# Patient Record
Sex: FEMALE | Race: WHITE | NOT HISPANIC OR LATINO | Employment: FULL TIME | URBAN - METROPOLITAN AREA
[De-identification: names, ages, dates, MRNs, and addresses within clinical notes are randomized per-mention and may not be internally consistent; named-entity substitution may affect disease eponyms.]

---

## 2017-03-16 ENCOUNTER — DOCUMENTATION ONLY (OUTPATIENT)
Dept: ENDOCRINOLOGY | Facility: CLINIC | Age: 47
End: 2017-03-16

## 2017-03-16 NOTE — PROGRESS NOTES
GRADE study visit    Patient is here for 36 month GRADE study visit. Patient is doing well and continues on Metformin 1000 mg bid in addition to randomized treatment of Victoza 1.2 mg daily (reduced from 1.8).  Her last a1c was 6.7%.  She is, however, continuing to have bloating and nausea and GERD symptoms.  She is taking a PPI, which helps.   She has tried avoiding dairy, which seems to have helped. She otherwise has no concerns today.       Lab results:   A1c: 7.3%    Plan:   1. Diabetes- A bit above target.  With her GI symptoms, offered to stop Victoza x 1 week to se eif symptoms improve.  If so, will rechallenge at 0.6 mg daily x 2 weeks.  She will let us know if she would like to do so. If no improvement, consider testing for H.pylori.  2. Follow up in 3 months, sooner with concerns.     Maryanne Okeefe PA-C, MPAS   [facility]  Department of Medicine  Division of Endocrinology and Diabetes

## 2017-06-15 ENCOUNTER — DOCUMENTATION ONLY (OUTPATIENT)
Dept: ENDOCRINOLOGY | Facility: CLINIC | Age: 47
End: 2017-06-15

## 2017-06-15 NOTE — PROGRESS NOTES
GRADE study visit    Patient is here for 42 month GRADE study visit. Patient is doing well and continues on Metformin 1000 mg bid in addition to randomized treatment of Victoza 1.2 mg daily.  Her GI side effects improved after she stopped Victoza at her last visit x 1 week.   She then re-challenged on 1.2 mg and did fine.    She is trying to make better food choices, but struggles with this.  She will be traveling in [place] in August. She otherwise has no concerns today.       Lab results:   A1c: 7.1%    Plan:   1. Diabetes- A bit above target.  No change.  Can not tolerate higher dose of Victoza.  Encouraged increasing fruit/vegetable consumption.   2. Follow up in 3 months, sooner with concerns.     Maryanne Okeefe PA-C, MPAS   [facility]  Department of Medicine  Division of Endocrinology and Diabetes

## 2017-09-25 ENCOUNTER — DOCUMENTATION ONLY (OUTPATIENT)
Dept: ENDOCRINOLOGY | Facility: CLINIC | Age: 47
End: 2017-09-25

## 2017-09-25 LAB — HBA1C MFR BLD: 7.7 % (ref 0–5.7)

## 2017-09-25 NOTE — PROGRESS NOTES
"  GRADE study visit    Patient is here for 45 month GRADE study visit. Patient is doing well and continues on Metformin 1000 mg bid in addition to randomized treatment of victoza 1.2 mg. She found that she felt nasueated and \"sick\" on the 1.8 mg dose.  Weight is done 0.7 kg from last visit.  Hasn't been checking BG.            Plan:   1.  Diabetes- A1c pending today.  Continue current regimen for now with plan to alter as needed based on A1c.    2.  Follow up in 3 months.     Robyn Villarreal MD  [facility]  Department of Medicine  Division of Endocrinology and Diabetes        Robyn Villarreal MD  "

## 2017-12-11 ENCOUNTER — DOCUMENTATION ONLY (OUTPATIENT)
Dept: ENDOCRINOLOGY | Facility: CLINIC | Age: 47
End: 2017-12-11

## 2017-12-11 LAB
ALBUMIN URINE MG/G CR: 3.23 MG/G CREATININE
ALBUMIN URINE MG/SPEC: NORMAL
CREAT SERPL-MCNC: 0.77 MG/DL
CREATININE URINE: NORMAL
GFR SERPL CREATININE-BSD FRML MDRD: >90 ML/MIN/1.73M2
HBA1C MFR BLD: 7.7 % (ref 0–5.7)
LDLC SERPL CALC-MCNC: 108 MG/DL
VIT B12 SERPL-MCNC: 1584 PG/ML

## 2017-12-11 NOTE — PROGRESS NOTES
GRADE study visit    Patient is here for 48 month GRADE study visit. Patient is doing well and continues on Metformin 1000 mg bid in addition to randomized treatment of victoza. Her last A1c was 7.7 so she increased the dose from 1.2 mg to 1.8 mg a day. She is tolerating it OK.  Is concerned A1c will still be higher because she hasn't been eating as well as she should.         Plan:   1.  Diabetes- A1c pending today.  Continue current regimen for now with plan to alter as needed based on A1c.  Discussed that if A1c is > 7.5 protocol calls for us to add lantus.   2.  Follow up in 3 months.     Robyn Villarreal MD  [facility]  Department of Medicine  Division of Endocrinology and Diabetes        Robyn Villarreal MD

## 2018-03-26 ENCOUNTER — DOCUMENTATION ONLY (OUTPATIENT)
Dept: ENDOCRINOLOGY | Facility: CLINIC | Age: 48
End: 2018-03-26

## 2018-03-26 LAB — HBA1C MFR BLD: 8.4 % (ref 0–5.7)

## 2018-03-26 NOTE — PROGRESS NOTES
GRADE study visit    Patient is here for 51 month GRADE study visit. Patient is doing well and continues on Metformin 1000 mg bid in addition to randomized treatment of victoza 1.8 mg a day.. Her last A1c was 7.7 and she doesn't think it will be much better. Today.  She is tolerating victoza OK. Resigned to fact she will likely need to go on insulin.       Plan:   1.  Diabetes- A1c pending today.  Continue current regimen for now with plan to alter as needed based on A1c.  Discussed that if A1c is > 7.5 protocol calls for us to add lantus.   2.  Follow up in 3 months.     Robyn Villarreal MD  [facility]  Department of Medicine  Division of Endocrinology and Diabetes        Robyn Villarreal MD

## 2018-04-04 ENCOUNTER — DOCUMENTATION ONLY (OUTPATIENT)
Dept: ENDOCRINOLOGY | Facility: CLINIC | Age: 48
End: 2018-04-04

## 2018-04-04 DIAGNOSIS — E11.9 TYPE 2 DIABETES MELLITUS WITHOUT COMPLICATION, WITH LONG-TERM CURRENT USE OF INSULIN (H): Primary | ICD-10-CM

## 2018-04-04 DIAGNOSIS — Z79.4 TYPE 2 DIABETES MELLITUS WITHOUT COMPLICATION, WITH LONG-TERM CURRENT USE OF INSULIN (H): Primary | ICD-10-CM

## 2018-04-04 NOTE — PROGRESS NOTES
GRADE Additional Study Visit    Patient is here for Lantus start. Hemoglobin A1c after last appointment was 8.4%. Risks and benefits of adding insulin discussed, particularly the risk of hypoglycemia and what to do if she has symptoms. Will start Lantus 20 units QHS and increase by one unit daily until fasting BG is <100. She will be in close contact with the GRADE study team during titration. Follow up in clinic in 3 months as scheduled.     Christiana Corrales MD  Endocrinology and Diabetes   [phone]

## 2018-07-20 ENCOUNTER — DOCUMENTATION ONLY (OUTPATIENT)
Dept: ENDOCRINOLOGY | Facility: CLINIC | Age: 48
End: 2018-07-20

## 2018-07-20 NOTE — PROGRESS NOTES
GRADE Study Visit    Patient is here for 54 month GRADE study visit. She is doing well. Current medications for diabetes are metformin XR 1000 mg twice daily, Victoza 1.8 mg daily, and Lantus 36 units daily. Lantus was added after her last study visit because of elevated A1c. She has started testing blood sugars every morning with typical values of 80-90. She had one morning blood sugar of 58 with symptoms, and no other hypoglycemia.     She has a new job which includes more walking and leaves less time for snacking. Weight is down 2.7 kg.     Lab Results:   Component      Latest Ref Rng & Units 7/20/2018   Hemoglobin A1C      % 5.9   Albumin Urine mg/g Cr      <30 MG/G Creatinine 2.9       Plan:  1. Diabetes. Doing well. Fasting blood sugars are on the low side, which increases her risk of hypoglycemia. Will decrease Lantus to 34 units daily, and advised her she can titrate to goal fasting blood sugar of  most if the time.   2. Follow up in 3 months, sooner with concerns.     Christiana Corrales MD  [facility]  Department of Medicine  Division of Endocrinology and Diabetes

## 2018-07-24 LAB
ALBUMIN URINE MG/G CR: 2.9 MG/G CREATININE
ALBUMIN URINE MG/SPEC: NORMAL
CREATININE URINE: NORMAL
HBA1C MFR BLD: 5.9 % (ref 0–5.7)

## 2018-10-12 ENCOUNTER — DOCUMENTATION ONLY (OUTPATIENT)
Dept: ENDOCRINOLOGY | Facility: CLINIC | Age: 48
End: 2018-10-12

## 2018-10-12 NOTE — PROGRESS NOTES
GRADE study visit    Patient is here for 57 month GRADE study visit. Patient is doing well and continues on Metformin 1000 XR mg bid in addition to randomized treatment of Victoza 1.8 mg daily. She is also taking Lantus 34 units HS. Patient is tolerating medications and has been having no side effects. Patient is testing blood sugars every morning with a range of  for the past 2 weeks. No symptomatic hypoglycemia but several am readings below 70 mg/dl.    Blood pressure: 115/79    Lab results:   A1c: 6.1    IMP: Doing well but low am readings.  Plan: Will continue current regimen except reduce Lantus to 32 units HS. Follow up in 3 months, sooner with concerns.     Dawood Corrales MD  [facility]  Department of Medicine  Division of Endocrinology and Diabetes

## 2018-10-15 LAB — HBA1C MFR BLD: 6.1 % (ref 0–5.6)

## 2019-01-03 ENCOUNTER — DOCUMENTATION ONLY (OUTPATIENT)
Dept: ENDOCRINOLOGY | Facility: CLINIC | Age: 49
End: 2019-01-03

## 2019-01-03 LAB
CHOLEST SERPL-MCNC: 155 MG/DL
CREAT SERPL-MCNC: 0.88 MG/DL
HBA1C MFR BLD: 5.8 % (ref 0–5.6)
HDLC SERPL-MCNC: 38 MG/DL
LDL CHOLESTEROL: 89 MG/DL
MICROALBUMIN - QUEST: 3.17
TRIGL SERPL-MCNC: 141 MG/DL

## 2019-01-03 NOTE — PROGRESS NOTES
GRADE study visit    Patient is here for 60 month GRADE study visit. Patient continues on Metformin 1000 XR mg bid in addition to randomized treatment of Victoza 1.8 mg daily. She is also taking Lantus 32 units HS.  She has having frequent nausea and vomiting, so she looked up her symptoms online and determined that she has gastroparesis.  She has changed her diet to low fiber, cooked vegetables only and she feels much better.  She has been on a PPI for years for GERD symptoms and she has weaned down to every other day omeprazole.  Patient is testing blood sugars every morning with an average of 90 mg/dL.  She has had several readings in the 40's and 50's in the middle of the night which wake her up.  No other concerns. Last a1c was 6.1%.     Blood pressure: 114/88    Lab results:   A1c: 5.8%   Cholesterol: 155  Triglycerides: 141  HDL: 38  LDL: 89  Creatinine: 0.88  Albumin:creatinine Ratio: 3.17    Plan:   1.  Diabetes- Doing quite well, but having too much hypoglycemia.  Will continue current regimen except reduce Lantus to 28 units HS. Offered to discontinue or significantly reduce Victoza as I suspect her 'gastroparesis' symptoms are truly due to GI slowing from GLP-1 agonist.  Also concerned about her taking long term PPI (likely GLP is worsening her GERD symptoms).  I suggested weaning off the PPI and following up with her PCP.  She says symptoms are improved after changing her diet and she prefers to remain on Victoza for now.  She will let us know if she changes her mind.    2. Follow up in 3 months, sooner with concerns.     Maryanne Okeefe PA-C  [facility]  Department of Medicine  Division of Endocrinology and Diabetes

## 2019-04-16 ENCOUNTER — DOCUMENTATION ONLY (OUTPATIENT)
Dept: ENDOCRINOLOGY | Facility: CLINIC | Age: 49
End: 2019-04-16

## 2019-04-16 DIAGNOSIS — E11.9 TYPE 2 DIABETES MELLITUS WITHOUT COMPLICATION, WITH LONG-TERM CURRENT USE OF INSULIN (H): ICD-10-CM

## 2019-04-16 DIAGNOSIS — Z79.4 TYPE 2 DIABETES MELLITUS WITHOUT COMPLICATION, WITH LONG-TERM CURRENT USE OF INSULIN (H): ICD-10-CM

## 2019-04-16 NOTE — PROGRESS NOTES
GRADE study visit    Subject is here for 63 month GRADE study visit. Subject is doing well and continues on Metformin 2000 mg daily in addition to randomized treatment of Victoza 1.8 mg daily.  Subject now also on Lantus per protocol - current dose 28 units daily.    Subject is tolerating medications- was having some problems with nausea/vomiting at times but has found that with drinking lots of water and some change in diet this has improved and currently she does not feel it is an issue.     Subject is testing blood sugars regularly in am fasting - bs typically in 90's.  No problems with hypoglycemia.    Last A1c = 5.8%    Lab results:     A1c: 6.1      Plan:   1. Diabetes- A1c in target - no change in study meds.  2. Follow up in 3 months, sooner with concerns.       Mejia Menezes MD  [facility]  Department of Medicine  Division of Endocrinology and Diabetes

## 2019-04-18 LAB — HBA1C MFR BLD: 6.1 % (ref 0–5.6)

## 2019-06-25 ENCOUNTER — DOCUMENTATION ONLY (OUTPATIENT)
Dept: ENDOCRINOLOGY | Facility: CLINIC | Age: 49
End: 2019-06-25

## 2019-06-25 NOTE — PROGRESS NOTES
GRADE study visit     Subject is here for 66 month GRADE study visit. Subject is doing well and continues on Metformin 2000 mg daily in addition to randomized treatment of Victoza 1.8 mg daily.  Subject now also on Lantus per protocol - current dose 28 units daily.      Subject is testing blood sugars regularly in am fasting - bs typically in  range .  No problems with hypoglycemia.     Last A1c = 6.1%     Lab results:      A1c:  6.3  Ur alb/cr  3.77        Plan:   1. Diabetes- A1c at goal - no change in Rx.   2. Follow up in 3 months, sooner with concerns.         Mejia Menezes MD  [facility]  Department of Medicine  Division of Endocrinology and Diabetes

## 2019-07-02 LAB
ALBUMIN URINE MG/G CR: 3.77 MG/G CREATININE
ALBUMIN URINE MG/SPEC: 11
CREATININE URINE: 292
HBA1C MFR BLD: 6.3 % (ref 0–5.6)

## 2019-09-17 ENCOUNTER — DOCUMENTATION ONLY (OUTPATIENT)
Dept: ENDOCRINOLOGY | Facility: CLINIC | Age: 49
End: 2019-09-17

## 2019-09-17 DIAGNOSIS — Z79.4 TYPE 2 DIABETES MELLITUS WITHOUT COMPLICATION, WITH LONG-TERM CURRENT USE OF INSULIN (H): ICD-10-CM

## 2019-09-17 DIAGNOSIS — E11.9 TYPE 2 DIABETES MELLITUS WITHOUT COMPLICATION, WITH LONG-TERM CURRENT USE OF INSULIN (H): ICD-10-CM

## 2019-09-17 NOTE — PROGRESS NOTES
GRADE study visit     Subject is here for 69 month GRADE study visit. Subject is doing well and continues on Metformin 2000 mg daily in addition to randomized treatment of Victoza 1.8 mg daily.  Subject now also on Lantus per protocol - current dose 28 units daily.      Subject is testing blood sugars regularly in am fasting - bs typically in 70's - 80's range .  No problems with hypoglycemia.     Last A1c = 6.3%     Lab results:      A1c:  6.1    Plan:   1. Diabetes- A1c excellent - given fasting bs levels we can cut back Lantus a little - will recc she go to 25 units daily.    2. Follow up in 3 months, sooner with concerns.         Mejia Menezes MD  [facility]  Department of Medicine  Division of Endocrinology and Diabetes

## 2019-10-01 LAB — HBA1C MFR BLD: 6.1 % (ref 0–5.6)

## 2019-12-11 ENCOUNTER — DOCUMENTATION ONLY (OUTPATIENT)
Dept: ENDOCRINOLOGY | Facility: CLINIC | Age: 49
End: 2019-12-11

## 2019-12-11 LAB
CHOLEST SERPL-MCNC: 151 MG/DL
CHOLEST/HDLC SERPL: NORMAL {RATIO}
HDLC SERPL-MCNC: 42 MG/DL
LDLC SERPL CALC-MCNC: 83 MG/DL
TRIGL SERPL-MCNC: 129 MG/DL

## 2019-12-11 NOTE — PROGRESS NOTES
GRADE study visit     Subject is here for 72 month GRADE study visit. Subject is doing well and continues on Metformin 2000 mg daily in addition to randomized treatment of Victoza 1.8 mg daily.  Subject now also on Lantus per protocol - current dose 24 units daily.      Subject is testing blood sugars regularly in am fasting - bs typically in 80's range .  No problems with hypoglycemia. She continues to exercise regularly.     Last A1c = 6.1%     Lab results:   Lab Results   Component Value Date    A1C 5.9 12/11/2019    A1C 6.1 09/17/2019    A1C 6.3 06/25/2019    A1C 6.1 04/16/2019    A1C 5.8 01/03/2019       Plan:   1. Diabetes- A1c excellent - continue current regimen    2. Follow up in 3 months, sooner with concerns.       Tahir Rendon MD  Division of Diabetes and Endocrinology  Department of Medicine

## 2019-12-12 LAB
ALBUMIN URINE MG/G CR: NORMAL MG/G CREATININE
ALBUMIN URINE MG/SPEC: <1
CREAT SERPL-MCNC: 0.91 MG/DL
CREATININE URINE: 37
GFR SERPL CREATININE-BSD FRML MDRD: 73 ML/MIN/1.73M2
HBA1C MFR BLD: 5.9 % (ref 0–5.6)

## 2020-03-19 ENCOUNTER — DOCUMENTATION ONLY (OUTPATIENT)
Dept: ENDOCRINOLOGY | Facility: CLINIC | Age: 50
End: 2020-03-19

## 2020-05-06 LAB — HBA1C MFR BLD: 6 % (ref 0–5.6)

## 2020-05-07 NOTE — PROGRESS NOTES
GRADE study visit     Subject is on the phone for 75 month GRADE study visit. Subject is doing well and continues on Metformin 2000 mg daily in addition to randomized treatment of Victoza 1.8 mg daily.  Subject now also on Lantus per protocol - current dose 24 units daily.      Subject is testing blood sugars regularly in am fasting - bs typically in 80's range .  No problems with hypoglycemia. She continues to exercise regularly.     Plan:   1. Diabetes-Doing well continue current regimen    2. Follow up in 3 months, sooner with concerns.      GUILHERME Palmer  Division of Diabetes and Endocrinology  Department of Medicine

## 2020-06-25 ENCOUNTER — DOCUMENTATION ONLY (OUTPATIENT)
Dept: ENDOCRINOLOGY | Facility: CLINIC | Age: 50
End: 2020-06-25

## 2020-06-25 NOTE — PROGRESS NOTES
GRADE study visit     Subject is on the phone for 78 month GRADE study visit. Subject is doing well and continues on Metformin 2000 mg daily in addition to randomized treatment of Victoza 1.8 mg daily.  Subject now also on Lantus per protocol - current dose 24 units daily.      Subject is testing blood sugars regularly in am fasting - bs typically in 80's range .  No problems with hypoglycemia. She continues to exercise regularly.     Plan:   1. Diabetes-Doing well continue current regimen.  Discussed COVID 19 precautions.     2. Follow up in 3 months, sooner with concerns.      LARRY Palmer_C  Division of Diabetes and Endocrinology  Department of Medicine

## 2020-09-24 ENCOUNTER — DOCUMENTATION ONLY (OUTPATIENT)
Dept: ENDOCRINOLOGY | Facility: CLINIC | Age: 50
End: 2020-09-24

## 2020-09-24 NOTE — PROGRESS NOTES
GRADE study visit     Subject is on the phone for 81 month GRADE study visit. Subject is doing well and continues on Metformin 2000 mg daily in addition to randomized treatment of Victoza 1.8 mg daily.  Subject now also on Lantus per protocol - current dose 22 units daily.      Subject is testing blood sugars regularly in am fasting - bs typically in 90's range .  No problems with hypoglycemia. She continues to exercise regularly.     Plan:   1. Diabetes-Doing well.   Will try to reduce Lantus further to 20 units, as glucose is well controlled.  Post-study, may trial off Lantus.  Discussed COVID 19 precautions.     2. Follow up in 3 months, sooner with concerns.      LARRY Palmer_C  Division of Diabetes and Endocrinology  Department of Medicine

## 2021-01-07 ENCOUNTER — DOCUMENTATION ONLY (OUTPATIENT)
Dept: ENDOCRINOLOGY | Facility: CLINIC | Age: 51
End: 2021-01-07

## 2021-01-07 LAB
ALBUMIN/CREATININE RATIO: 3.26
CHOLEST SERPL-MCNC: 149 MG/DL
CREAT SERPL-MCNC: 0.86 MG/DL
GFR SERPL CREATININE-BSD FRML MDRD: 77 ML/MIN/1.7
HBA1C MFR BLD: 5.9 % (ref 0–5.6)
HDLC SERPL-MCNC: 44 MG/DL
LDL CHOLESTEROL: 82 MG/DL
TRIGL SERPL-MCNC: 115 MG/DL

## 2021-01-07 NOTE — PROGRESS NOTES
Dear Ms. Lucia,       As you may recall, Anne has been a participant in the LEANDRO Study.  LEANDRO is a multicenter diabetes study in the [place] funded by the [facility] with a goal of determining whether there is an optimal combination of medications for treating type 2 diabetes.  All participants in GRADE were prescribed metformin and were initially randomized to one of four additional diabetes medications:   (1) glimepiride (Amaryl), (2) insulin glargine (Lantus), (3) liraglutide (Victoza) or (4) sitagliptin (Januvia).  Anne will be returning to you for diabetes management from the GRADE Study. We want to communicate the following at this time:    Her last [facility] study visit was 1/7/2021    She was given a 30 day supply of diabetes medication(s) and was instructed to make a follow-up appointment with you within 30 days for  new diabetes medication prescriptions    Attached is a report of her [facility] Study progress including labwork and medication history      Below is a brief summary of your patient's diabetes progression and salient clinical concerns during their enrollment in [facility]:       Anne enrolled in the [facility] study on 9/16/2013 at which time her a1c was 9.1%.  She was randomized to receive metformin and Victoza.  Over the course of the study, she managed quite well (a1c <7%) until 9/2017, when her a1c climbed to 7.7% and Lantus insulin was added as rescue therapy.  She has since been able to maintain her a1c in the 5-6% range with very little hypoglycemia. She has been able to maintain a significant weight loss (baseline weight 104kg, now 100 kg).     Anne's A1c value at his/her last [facility] study visit was 6.1%.  Currently Anne is taking:   Metformin (ER) 1000 mg/ bid   Victoza 1.8 mg daily   Lantus 15 units daily    The American Diabetes Association (ADA) recommends individualizing A1c glycemic targets for people with type 2 diabetes.  Factors to consider include duration of diabetes, presence of other important chronic  conditions, especially cardiovascular conditions, potential risk of medication side effects such as hypoglycemia, patient preference and cost of medications.  For many people, an A1c target < 7% may be appropriate.  However, depending on factors above, a more relaxed target of < 8% may be acceptable.    The ADA recommends metformin as the first line medication for treating type 2 diabetes.  When a second medication is needed, factors which might influence choice include presence of cardiovascular or renal disease, risk of hypoglycemia, desire for weight neutral or weight loss effects, patient preference and cost.  The ADA Standards of Care (https://professional.diabetes.org/) are a resource for providers in the management of type 2 diabetes.  We hope that when the results of the GRADE study are available, they will provide further guidance for physicians and patients in the choice of medication regimens.      The results of the GRADE Study will be available at the end of 2021.  We will be providing GRADE participants the main findings of the trial at that time prior to release to the general public.   We greatly appreciate the opportunity to participate in Anne's diabetes care over the course of the GRADE study, and we appreciate Anne's donation of time and effort as a participant in the study.  Please feel free to contact us at [phone] if you have questions or if we can provide further information during this transition.    Sincerely,      Maryanne Okeefe PA-C, MPAS  GRADE Study Investigator

## 2021-01-07 NOTE — LETTER
Patient:  Anne Kirk  :   1970  MRN:     [id number]    2021    Dear Ms. Lucia,     As you may recall, Anne has been a participant in the [place] Study.  [facility] is a multicenter diabetes study in the [place] funded by the [facility] with a goal of determining whether there is an optimal combination of medications for treating type 2 diabetes.  All participants in GRADE were prescribed metformin and were initially randomized to one of four additional diabetes medications:   (1) glimepiride (Amaryl), (2) insulin glargine (Lantus), (3) liraglutide (Victoza) or (4) sitagliptin (Januvia).  Anne will be returning to you for diabetes management from the GRADE Study. We want to communicate the following at this time:    Her last [facility] study visit was 2021    She was given a 30 day supply of diabetes medication(s) and was instructed to make a follow-up appointment with you within 30 days for  new diabetes medication prescriptions    Attached is a report of her [place] Study progress including labwork and medication history      Below is a brief summary of your patient's diabetes progression and salient clinical concerns during their enrollment in [facility]:       Anne enrolled in the [place] study on 2013 at which time her a1c was 9.1%.  She was randomized to receive metformin and Victoza.  Over the course of the study, she managed quite well (a1c <7%) until 2017, when her a1c climbed to 7.7% and Lantus insulin was added as rescue therapy.  She has since been able to maintain her a1c in the 5-6% range with very little hypoglycemia. She has been able to maintain a significant weight loss (baseline weight 104kg, now 100 kg).     Anne's A1c value at his/her last [facility] study visit was 6.1%.  Currently Anne is taking:   Metformin (ER) 1000 mg bid   Victoza 1.8 mg daily   Lantus 15 units daily    The American Diabetes Association (ADA) recommends individualizing A1c glycemic targets for people with type 2 diabetes.   Factors to consider include duration of diabetes, presence of other important chronic conditions, especially cardiovascular conditions, potential risk of medication side effects such as hypoglycemia, patient preference and cost of medications.  For many people, an A1c target < 7% may be appropriate.  However, depending on factors above, a more relaxed target of < 8% may be acceptable.    The ADA recommends metformin as the first line medication for treating type 2 diabetes.  When a second medication is needed, factors which might influence choice include presence of cardiovascular or renal disease, risk of hypoglycemia, desire for weight neutral or weight loss effects, patient preference and cost.  The ADA Standards of Care (https://professional.diabetes.org/) are a resource for providers in the management of type 2 diabetes.  We hope that when the results of the GRADE study are available, they will provide further guidance for physicians and patients in the choice of medication regimens.      The results of the GRADE Study will be available at the end of 2021.  We will be providing GRADE participants the main findings of the trial at that time prior to release to the general public.   We greatly appreciate the opportunity to participate in Anne's diabetes care over the course of the GRADE study, and we appreciate Anne's donation of time and effort as a participant in the study.  Please feel free to contact us at [phone] if you have questions or if we can provide further information during this transition.    Sincerely,      Maryanne Okeefe PA-C, MPAS  GRADE Study Investigator

## 2022-04-15 ENCOUNTER — TELEPHONE (OUTPATIENT)
Dept: UROLOGY | Facility: CLINIC | Age: 52
End: 2022-04-15

## 2022-04-15 ENCOUNTER — APPOINTMENT (OUTPATIENT)
Dept: CT IMAGING | Facility: HOSPITAL | Age: 52
End: 2022-04-15
Attending: EMERGENCY MEDICINE
Payer: COMMERCIAL

## 2022-04-15 ENCOUNTER — HOSPITAL ENCOUNTER (EMERGENCY)
Facility: HOSPITAL | Age: 52
Discharge: HOME | End: 2022-04-15
Attending: EMERGENCY MEDICINE | Admitting: EMERGENCY MEDICINE
Payer: COMMERCIAL

## 2022-04-15 VITALS
HEIGHT: 63 IN | BODY MASS INDEX: 33.66 KG/M2 | WEIGHT: 190 LBS | OXYGEN SATURATION: 100 % | TEMPERATURE: 97.1 F | HEART RATE: 71 BPM | DIASTOLIC BLOOD PRESSURE: 86 MMHG | SYSTOLIC BLOOD PRESSURE: 176 MMHG | RESPIRATION RATE: 20 BRPM

## 2022-04-15 DIAGNOSIS — R10.9 FLANK PAIN: ICD-10-CM

## 2022-04-15 DIAGNOSIS — N20.0 KIDNEY STONE: ICD-10-CM

## 2022-04-15 DIAGNOSIS — N20.1 URETEROLITHIASIS: ICD-10-CM

## 2022-04-15 LAB
ALBUMIN SERPL-MCNC: 4.1 G/DL (ref 3.5–5)
ALBUMIN UR-MCNC: NEGATIVE MG/DL
ALP SERPL-CCNC: 51 U/L (ref 45–120)
ALT SERPL W P-5'-P-CCNC: 19 U/L (ref 0–45)
ANION GAP SERPL CALCULATED.3IONS-SCNC: 11 MMOL/L (ref 5–18)
APPEARANCE UR: CLEAR
AST SERPL W P-5'-P-CCNC: 9 U/L (ref 0–40)
BACTERIA #/AREA URNS HPF: ABNORMAL /HPF
BASOPHILS # BLD MANUAL: 0 10E3/UL (ref 0–0.2)
BASOPHILS NFR BLD MANUAL: 0 %
BILIRUB SERPL-MCNC: 0.3 MG/DL (ref 0–1)
BILIRUB UR QL STRIP: NEGATIVE
BUN SERPL-MCNC: 21 MG/DL (ref 8–22)
CALCIUM SERPL-MCNC: 9.3 MG/DL (ref 8.5–10.5)
CHLORIDE BLD-SCNC: 105 MMOL/L (ref 98–107)
CO2 SERPL-SCNC: 23 MMOL/L (ref 22–31)
COLOR UR AUTO: ABNORMAL
CREAT SERPL-MCNC: 1.18 MG/DL (ref 0.6–1.1)
EOSINOPHIL # BLD MANUAL: 0.1 10E3/UL (ref 0–0.7)
EOSINOPHIL NFR BLD MANUAL: 1 %
ERYTHROCYTE [DISTWIDTH] IN BLOOD BY AUTOMATED COUNT: 12.4 % (ref 10–15)
GFR SERPL CREATININE-BSD FRML MDRD: 55 ML/MIN/1.73M2
GLUCOSE BLD-MCNC: 140 MG/DL (ref 70–125)
GLUCOSE UR STRIP-MCNC: NEGATIVE MG/DL
HCT VFR BLD AUTO: 40.4 % (ref 35–47)
HGB BLD-MCNC: 13.5 G/DL (ref 11.7–15.7)
HGB UR QL STRIP: NEGATIVE
KETONES UR STRIP-MCNC: NEGATIVE MG/DL
LEUKOCYTE ESTERASE UR QL STRIP: NEGATIVE
LIPASE SERPL-CCNC: 68 U/L (ref 0–52)
LYMPHOCYTES # BLD MANUAL: 4.7 10E3/UL (ref 0.8–5.3)
LYMPHOCYTES NFR BLD MANUAL: 35 %
MCH RBC QN AUTO: 31.5 PG (ref 26.5–33)
MCHC RBC AUTO-ENTMCNC: 33.4 G/DL (ref 31.5–36.5)
MCV RBC AUTO: 94 FL (ref 78–100)
MONOCYTES # BLD MANUAL: 0.9 10E3/UL (ref 0–1.3)
MONOCYTES NFR BLD MANUAL: 7 %
MUCOUS THREADS #/AREA URNS LPF: PRESENT /LPF
NEUTROPHILS # BLD MANUAL: 7.7 10E3/UL (ref 1.6–8.3)
NEUTROPHILS NFR BLD MANUAL: 57 %
NITRATE UR QL: NEGATIVE
PH UR STRIP: 5 [PH] (ref 5–7)
PLAT MORPH BLD: ABNORMAL
PLATELET # BLD AUTO: 242 10E3/UL (ref 150–450)
POLYCHROMASIA BLD QL SMEAR: SLIGHT
POTASSIUM BLD-SCNC: 4.2 MMOL/L (ref 3.5–5)
PROT SERPL-MCNC: 7.5 G/DL (ref 6–8)
RBC # BLD AUTO: 4.28 10E6/UL (ref 3.8–5.2)
RBC MORPH BLD: ABNORMAL
RBC URINE: <1 /HPF
SODIUM SERPL-SCNC: 139 MMOL/L (ref 136–145)
SP GR UR STRIP: 1.02 (ref 1–1.03)
SQUAMOUS EPITHELIAL: 7 /HPF
UROBILINOGEN UR STRIP-MCNC: <2 MG/DL
WBC # BLD AUTO: 13.5 10E3/UL (ref 4–11)
WBC URINE: 1 /HPF

## 2022-04-15 PROCEDURE — 96374 THER/PROPH/DIAG INJ IV PUSH: CPT

## 2022-04-15 PROCEDURE — 83690 ASSAY OF LIPASE: CPT | Performed by: EMERGENCY MEDICINE

## 2022-04-15 PROCEDURE — 258N000003 HC RX IP 258 OP 636: Performed by: EMERGENCY MEDICINE

## 2022-04-15 PROCEDURE — 36415 COLL VENOUS BLD VENIPUNCTURE: CPT | Performed by: EMERGENCY MEDICINE

## 2022-04-15 PROCEDURE — 80053 COMPREHEN METABOLIC PANEL: CPT | Performed by: EMERGENCY MEDICINE

## 2022-04-15 PROCEDURE — 99285 EMERGENCY DEPT VISIT HI MDM: CPT | Mod: 25

## 2022-04-15 PROCEDURE — 250N000011 HC RX IP 250 OP 636: Performed by: EMERGENCY MEDICINE

## 2022-04-15 PROCEDURE — 85027 COMPLETE CBC AUTOMATED: CPT | Performed by: EMERGENCY MEDICINE

## 2022-04-15 PROCEDURE — 96375 TX/PRO/DX INJ NEW DRUG ADDON: CPT

## 2022-04-15 PROCEDURE — 74176 CT ABD & PELVIS W/O CONTRAST: CPT

## 2022-04-15 PROCEDURE — 96361 HYDRATE IV INFUSION ADD-ON: CPT

## 2022-04-15 PROCEDURE — 81001 URINALYSIS AUTO W/SCOPE: CPT | Performed by: EMERGENCY MEDICINE

## 2022-04-15 PROCEDURE — 250N000013 HC RX MED GY IP 250 OP 250 PS 637: Performed by: EMERGENCY MEDICINE

## 2022-04-15 RX ORDER — SODIUM CHLORIDE 9 MG/ML
INJECTION, SOLUTION INTRAVENOUS CONTINUOUS
Status: DISCONTINUED | OUTPATIENT
Start: 2022-04-15 | End: 2022-04-15 | Stop reason: HOSPADM

## 2022-04-15 RX ORDER — DIMENHYDRINATE 50 MG
50 TABLET ORAL EVERY 6 HOURS PRN
Qty: 28 TABLET | Refills: 0 | Status: SHIPPED | OUTPATIENT
Start: 2022-04-15 | End: 2022-04-22

## 2022-04-15 RX ORDER — OXYCODONE HYDROCHLORIDE 5 MG/1
10 TABLET ORAL ONCE
Status: COMPLETED | OUTPATIENT
Start: 2022-04-15 | End: 2022-04-15

## 2022-04-15 RX ORDER — ONDANSETRON 4 MG/1
4 TABLET, ORALLY DISINTEGRATING ORAL EVERY 8 HOURS PRN
Qty: 12 TABLET | Refills: 0 | Status: SHIPPED | OUTPATIENT
Start: 2022-04-15 | End: 2022-04-18

## 2022-04-15 RX ORDER — OXYCODONE HYDROCHLORIDE 5 MG/1
5 TABLET ORAL EVERY 4 HOURS PRN
Qty: 12 TABLET | Refills: 0 | Status: SHIPPED | OUTPATIENT
Start: 2022-04-15 | End: 2022-04-19

## 2022-04-15 RX ORDER — ACETAMINOPHEN 500 MG
1000 TABLET ORAL EVERY 6 HOURS
Qty: 56 TABLET | Refills: 0 | Status: SHIPPED | OUTPATIENT
Start: 2022-04-15 | End: 2022-04-22

## 2022-04-15 RX ORDER — DIMENHYDRINATE 50 MG
50 TABLET ORAL AT BEDTIME
Qty: 7 TABLET | Refills: 0 | Status: SHIPPED | OUTPATIENT
Start: 2022-04-15 | End: 2022-04-22

## 2022-04-15 RX ORDER — IBUPROFEN 200 MG
400 TABLET ORAL EVERY 6 HOURS
Qty: 56 TABLET | Refills: 0 | Status: SHIPPED | OUTPATIENT
Start: 2022-04-15 | End: 2022-04-22

## 2022-04-15 RX ORDER — ONDANSETRON 2 MG/ML
4 INJECTION INTRAMUSCULAR; INTRAVENOUS EVERY 30 MIN PRN
Status: DISCONTINUED | OUTPATIENT
Start: 2022-04-15 | End: 2022-04-15 | Stop reason: HOSPADM

## 2022-04-15 RX ORDER — KETOROLAC TROMETHAMINE 30 MG/ML
15 INJECTION, SOLUTION INTRAMUSCULAR; INTRAVENOUS ONCE
Status: COMPLETED | OUTPATIENT
Start: 2022-04-15 | End: 2022-04-15

## 2022-04-15 RX ADMIN — SODIUM CHLORIDE 500 ML: 9 INJECTION, SOLUTION INTRAVENOUS at 02:40

## 2022-04-15 RX ADMIN — SODIUM CHLORIDE: 9 INJECTION, SOLUTION INTRAVENOUS at 01:41

## 2022-04-15 RX ADMIN — ONDANSETRON 4 MG: 2 INJECTION INTRAMUSCULAR; INTRAVENOUS at 01:40

## 2022-04-15 RX ADMIN — OXYCODONE HYDROCHLORIDE 10 MG: 5 TABLET ORAL at 03:08

## 2022-04-15 RX ADMIN — KETOROLAC TROMETHAMINE 15 MG: 30 INJECTION, SOLUTION INTRAMUSCULAR at 01:41

## 2022-04-15 ASSESSMENT — ENCOUNTER SYMPTOMS
COUGH: 0
CONSTIPATION: 0
FLANK PAIN: 1
BACK PAIN: 1
VOMITING: 1
SHORTNESS OF BREATH: 0
ABDOMINAL PAIN: 0
DIARRHEA: 0
NAUSEA: 1
FEVER: 0

## 2022-04-15 NOTE — TELEPHONE ENCOUNTER
Spoke with patient who is feeling well today.  He does not wish to schedule an appointment at this time.  Education given regarding medications and number to call with any questions or to schedule an appt.  Clementine Bland RN

## 2022-04-15 NOTE — ED PROVIDER NOTES
NAME: Anne Kirk  AGE: 51 year old female  YOB: 1970  MRN: [id number]  EVALUATION DATE & TIME: No admission date for patient encounter.    PCP: No primary care provider on file.    ED PROVIDER: Dami Wei M.D.      Chief Complaint   Patient presents with     Back Pain     R sided     Nausea     FINAL IMPRESSION:  1. Ureterolithiasis    2. Kidney stone    3. Flank pain        MEDICAL DECISION MAKIN:02 AM I met with the patient, obtained history, performed an initial exam, and discussed options and plan for diagnostics and treatment here in the ED. PPE worn: n95 mask, eye protection and gloves.  2:55 AM Results were communicated with the patient. Discussed discharge plans along with return precautions. Patient was agreeable with plan.        Patient was clinically assessed and consented to treatment. After assessment, medical decision making and workup were discussed with the patient. The patient was agreeable to plan for testing, workup, and treatment.  Pertinent Labs & Imaging studies reviewed. (See chart for details)     Anne Kirk is a 51 year old female who presents with right-sided back pain with nausea and vomiting.   Differential diagnosis includes but not limited to kidney stone, pyelonephritis, diverticulitis, colitis, biliary colic.  Patient with right flank pain that is been building and now worsening with nausea and vomiting.  Patient also having repeat bowel movements but not diarrhea.  No blood and no other urinary symptoms.  Patient tender right over the right CVA or flank area.  Will start with labs and urine for further imaging such as ultrasound for gallbladder or CT scan with or without contrast.  IV fluids, pain medication, and antiemetics given.  CBC showed stable hemoglobin with slight leukocytosis.  Metabolic panel shows a slight elevation creatinine and patient was given IV fluids for rehydration.  Lipase was normal and urinalysis returned without any signs  of infection.  CT scan returned showing a right UVJ 2 mm kidney stone.  Patient likely will pass this on her own with expectant management.  We discussed this and patient was started on oral medications to go home on for continued pain control.  Patient does inform me that she is going on an international trip in 2 days.  Informed patient she will need to monitor how she is feeling and continue good hydration to clear urine in the stone as well as monitor pain.  She may need to make a last-minute decision not to go however if she is intent on the the stone is small and will likely pass soon.  Patient will be given prescriptions and is comfortable with plan for care at home as well as given referral and follow-up to the kidney stone [place].  Patient understands instructions and will be discharged home.    0 minutes of critical care time    MEDICATIONS GIVEN IN THE EMERGENCY:  Medications   sodium chloride 0.9% infusion ( Intravenous New Bag 4/15/22 0141)   ondansetron (ZOFRAN) injection 4 mg (4 mg Intravenous Given 4/15/22 0140)   HYDROmorphone (DILAUDID) injection 0.5 mg (has no administration in time range)   oxyCODONE (ROXICODONE) tablet 10 mg (has no administration in time range)   ketorolac (TORADOL) injection 15 mg (15 mg Intravenous Given 4/15/22 0141)   0.9% sodium chloride BOLUS (500 mLs Intravenous New Bag 4/15/22 0240)     NEW PRESCRIPTIONS STARTED AT TODAY'S ER VISIT:  New Prescriptions    ACETAMINOPHEN (TYLENOL) 500 MG TABLET    Take 2 tablets (1,000 mg) by mouth every 6 hours for 7 days    DIMENHYDRINATE (DRAMAMINE) 50 MG TABLET    Take 1 tablet (50 mg) by mouth At Bedtime for 7 days    DIMENHYDRINATE (DRAMAMINE) 50 MG TABLET    Take 1 tablet (50 mg) by mouth every 6 hours as needed for other (kidney stone pain management)    IBUPROFEN (ADVIL/MOTRIN) 200 MG TABLET    Take 2 tablets (400 mg) by mouth every 6 hours for 7 days    ONDANSETRON (ZOFRAN-ODT) 4 MG ODT TAB    Take 1 tablet (4 mg) by mouth  "every 8 hours as needed for nausea    OXYCODONE (ROXICODONE) 5 MG TABLET    Take 1 tablet (5 mg) by mouth every 4 hours as needed for severe pain If pain is not improved with acetaminophen and ibuprofen.     =================================================================    HPI    Patient information was obtained from: patient     Use of : N/A    Anne Kirk is a 51 year old female with a past medical history of T2DM, asthma, obesity, hyperlipidemia, who presents lower right back pain, frequent bowel movements, vomiting, nausea.    Patient reports developing right lower back tightness for the past 3 days that worsened tonight approximately at 11:30PM which woke her up with \"sharp shooting pain\" along with nausea and vomiting. There is no radiaiton to her abdomen or left side of her back. She also has been endorsing associated frequent bowel movements. She denies any actual constipation or diarrhea. She does not feel any relief from pain after bowel movement. No prior history of kidney stones. No fever, cough, shortness of breath. No other medical complaints at this time.     REVIEW OF SYSTEMS   Review of Systems   Constitutional: Negative for fever.   Respiratory: Negative for cough and shortness of breath.    Gastrointestinal: Positive for nausea and vomiting. Negative for abdominal pain, constipation and diarrhea.        Positive frequent bowel movements   Genitourinary: Positive for flank pain (right sided).   Musculoskeletal: Positive for back pain (RLQ \"sharp shooting\" ).   All other systems reviewed and are negative.    PAST MEDICAL HISTORY:  History reviewed. No pertinent past medical history.    PAST SURGICAL HISTORY:  History reviewed. No pertinent surgical history.    CURRENT MEDICATIONS:      Current Facility-Administered Medications:      HYDROmorphone (DILAUDID) injection 0.5 mg, 0.5 mg, Intravenous, Once PRN, Dami Wei MD     ondansetron (ZOFRAN) injection 4 mg, 4 mg, " "Intravenous, Q30 Min PRN, Dami Wei MD, 4 mg at 04/15/22 0140     oxyCODONE (ROXICODONE) tablet 10 mg, 10 mg, Oral, Once, Dami Wei MD     sodium chloride 0.9% infusion, , Intravenous, Continuous, Dami Wei MD, Last Rate: 125 mL/hr at 04/15/22 0141, New Bag at 04/15/22 0141    Current Outpatient Medications:      acetaminophen (TYLENOL) 500 MG tablet, Take 2 tablets (1,000 mg) by mouth every 6 hours for 7 days, Disp: 56 tablet, Rfl: 0     dimenhyDRINATE (DRAMAMINE) 50 MG tablet, Take 1 tablet (50 mg) by mouth At Bedtime for 7 days, Disp: 7 tablet, Rfl: 0     dimenhyDRINATE (DRAMAMINE) 50 MG tablet, Take 1 tablet (50 mg) by mouth every 6 hours as needed for other (kidney stone pain management), Disp: 28 tablet, Rfl: 0     ibuprofen (ADVIL/MOTRIN) 200 MG tablet, Take 2 tablets (400 mg) by mouth every 6 hours for 7 days, Disp: 56 tablet, Rfl: 0     ondansetron (ZOFRAN-ODT) 4 MG ODT tab, Take 1 tablet (4 mg) by mouth every 8 hours as needed for nausea, Disp: 12 tablet, Rfl: 0     oxyCODONE (ROXICODONE) 5 MG tablet, Take 1 tablet (5 mg) by mouth every 4 hours as needed for severe pain If pain is not improved with acetaminophen and ibuprofen., Disp: 12 tablet, Rfl: 0     STUDY MEDICATION, Lantus insulin at bedtime - 25 units. Medication provided and dose determined by GRADE study., Disp: , Rfl: 0     STUDY MEDICATION, Metformin  mg 2 tabs bid - medication provided by Grade study, Disp: 3 Month, Rfl: 3     STUDY MEDICATION, Victoza 1.8 mg sc daily - medication provided by Grade study., Disp: 3 Month, Rfl: 3    ALLERGIES:  Allergies   Allergen Reactions     Cephalexin Hives     Sulfamethoxazole Other (See Comments)     Sulfamethoxazole-Trimethoprim Hives     FAMILY HISTORY:  History reviewed. No pertinent family history.    SOCIAL HISTORY:      PHYSICAL EXAM:    Vitals: BP (!) 176/86   Pulse 71   Temp 97.1  F (36.2  C) (Temporal)   Resp 20   Ht 1.6 m (5' 3\")   " Wt 86.2 kg (190 lb)   SpO2 100%   BMI 33.66 kg/m     Physical Exam  Vitals and nursing note reviewed.   Constitutional:       General: She is not in acute distress.     Appearance: Normal appearance. She is normal weight. She is not ill-appearing or toxic-appearing.      Comments: Patient still nauseous and in pain the right low back   HENT:      Head: Normocephalic.   Cardiovascular:      Rate and Rhythm: Normal rate and regular rhythm.   Pulmonary:      Effort: Pulmonary effort is normal. No respiratory distress.      Breath sounds: Normal breath sounds. No stridor.   Abdominal:      General: There is no distension.      Palpations: Abdomen is soft.      Tenderness: There is no abdominal tenderness. There is right CVA tenderness and guarding. There is no left CVA tenderness or rebound.   Musculoskeletal:         General: Normal range of motion.      Right lower leg: No edema.      Left lower leg: No edema.   Skin:     General: Skin is warm and dry.      Capillary Refill: Capillary refill takes less than 2 seconds.      Coloration: Skin is not jaundiced or pale.   Neurological:      General: No focal deficit present.      Mental Status: She is alert.   Psychiatric:         Behavior: Behavior normal.        LAB:  All pertinent labs reviewed and interpreted.  Labs Ordered and Resulted from Time of ED Arrival to Time of ED Departure   ROUTINE UA WITH MICROSCOPIC REFLEX TO CULTURE - Abnormal       Result Value    Color Urine Light Yellow      Appearance Urine Clear      Glucose Urine Negative      Bilirubin Urine Negative      Ketones Urine Negative      Specific Gravity Urine 1.023      Blood Urine Negative      pH Urine 5.0      Protein Albumin Urine Negative      Urobilinogen Urine <2.0      Nitrite Urine Negative      Leukocyte Esterase Urine Negative      Bacteria Urine None Seen      Mucus Urine Present (*)     RBC Urine <1      WBC Urine 1      Squamous Epithelials Urine 7 (*)    COMPREHENSIVE METABOLIC PANEL  - Abnormal    Sodium 139      Potassium 4.2      Chloride 105      Carbon Dioxide (CO2) 23      Anion Gap 11      Urea Nitrogen 21      Creatinine 1.18 (*)     Calcium 9.3      Glucose 140 (*)     Alkaline Phosphatase 51      AST 9      ALT 19      Protein Total 7.5      Albumin 4.1      Bilirubin Total 0.3      GFR Estimate 55 (*)    LIPASE - Abnormal    Lipase 68 (*)    CBC WITH PLATELETS AND DIFFERENTIAL - Abnormal    WBC Count 13.5 (*)     RBC Count 4.28      Hemoglobin 13.5      Hematocrit 40.4      MCV 94      MCH 31.5      MCHC 33.4      RDW 12.4      Platelet Count 242     DIFFERENTIAL - Abnormal    % Neutrophils 57      % Lymphocytes 35      % Monocytes 7      % Eosinophils 1      % Basophils 0      Absolute Neutrophils 7.7      Absolute Lymphocytes 4.7      Absolute Monocytes 0.9      Absolute Eosinophils 0.1      Absolute Basophils 0.0      RBC Morphology Confirmed RBC Indices      Platelet Assessment        Value: Automated Count Confirmed. Platelet morphology is normal.    Polychromasia Slight (*)    URINE CULTURE       RADIOLOGY:  CT Abdomen Pelvis w/o Contrast   Final Result   IMPRESSION:    1.  2 mm calculus right UVJ. Mild right hydronephrosis.             I, Ariana Pa, am serving as a scribe to document services personally performed by Dr. Dami Wei  based on my observation and the provider's statements to me. I, Dami Wei MD attest that Ariana Pa is acting in a scribe capacity, has observed my performance of the services and has documented them in accordance with my direction.      Dami Wei M.D.  Emergency Medicine  [facility] and [facility]     Dami Wei MD  04/15/22 5402

## 2022-04-15 NOTE — ED TRIAGE NOTES
Pt reports tightness in her R back 3 days ago.  Tonight while she was sleeping she started having back pain.  Denies any injury, denies any hx of kidney stones.  Pt reports 3 bowel movements since the pain started but they are not diarrhea.